# Patient Record
Sex: MALE | Race: WHITE | NOT HISPANIC OR LATINO | RURAL
[De-identification: names, ages, dates, MRNs, and addresses within clinical notes are randomized per-mention and may not be internally consistent; named-entity substitution may affect disease eponyms.]

---

## 2018-07-11 PROBLEM — Z00.00 WELLNESS EXAMINATION: Status: ACTIVE | Noted: 2018-07-11

## 2018-07-11 PROBLEM — G24.8 HEMIDYSTONIA: Status: ACTIVE | Noted: 2018-07-11

## 2020-02-17 ENCOUNTER — OFFICE VISIT (OUTPATIENT)
Dept: FAMILY MEDICINE CLINIC | Facility: CLINIC | Age: 40
End: 2020-02-17

## 2020-02-17 VITALS
BODY MASS INDEX: 25.19 KG/M2 | SYSTOLIC BLOOD PRESSURE: 120 MMHG | OXYGEN SATURATION: 99 % | HEIGHT: 72 IN | TEMPERATURE: 98.3 F | DIASTOLIC BLOOD PRESSURE: 80 MMHG | RESPIRATION RATE: 16 BRPM | HEART RATE: 95 BPM | WEIGHT: 186 LBS

## 2020-02-17 DIAGNOSIS — R37 SEXUAL DYSFUNCTION: ICD-10-CM

## 2020-02-17 DIAGNOSIS — R41.840 ATTENTION DEFICIT: ICD-10-CM

## 2020-02-17 DIAGNOSIS — G24.8 HEMIDYSTONIA: Primary | ICD-10-CM

## 2020-02-17 PROBLEM — Z01.89 LABORATORY TEST: Status: ACTIVE | Noted: 2020-02-17

## 2020-02-17 PROCEDURE — 99395 PREV VISIT EST AGE 18-39: CPT | Performed by: FAMILY MEDICINE

## 2020-02-17 RX ORDER — DIAZEPAM 5 MG/1
5 TABLET ORAL NIGHTLY PRN
COMMUNITY
Start: 2019-11-21

## 2020-02-17 RX ORDER — ATOMOXETINE 40 MG/1
40 CAPSULE ORAL 2 TIMES DAILY
COMMUNITY
Start: 2020-02-14 | End: 2020-05-22

## 2020-02-17 RX ORDER — TRAMADOL HYDROCHLORIDE 50 MG/1
50 TABLET ORAL EVERY 12 HOURS PRN
COMMUNITY
Start: 2020-02-14 | End: 2020-08-13

## 2020-02-17 RX ORDER — BACLOFEN 10 MG/1
TABLET ORAL
COMMUNITY
Start: 2020-02-05

## 2020-02-17 NOTE — PROGRESS NOTES
Subjective   Carlos Magallon is a 39 y.o. male presenting with chief complaint of:   Chief Complaint   Patient presents with   • Establish Care   • Discuss medications       History of Present Illness :  Alone.       Has few chronic problems to consider that might have a bearing on today's issues; somewhat to establish care/ an interval appointment.       Chronic/acute problems reviewed today:   1. 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 Joshua is classified it is that he may dystonia.  The notes I read talk about some psychiatric issues.  Is that the patient doctor have some suspicion that there is an attention issue and he recently was allowed to start some Strattera; Dr. Joshua was concerned about him using Ritalin.  Patient is afraid the Strattera not going to work and wants to know if I will give him Ritalin.   2. Attention deficit: see above   3. Sexual dysfunction: libido,prematurity,ED: Chronic variable.  For years he has had difficulties with sexual function.  More recently had diminished libido that is causing very minimal but some marital stress.  May go a month without intercourse.  Erections are okay for insertion and most of time to complete intercourse; rarely not enough to complete.  Significant prematurity.  The same to talk about this.  Thinks his testosterone may be low.     Has an/another acute issue today: none.    The following portions of the patient's history were reviewed and updated as appropriate: allergies, current medications, past family history, past medical history, past social history, past surgical history and problem list.      Current Outpatient Medications:   •  atomoxetine (STRATTERA) 40 MG capsule, Take 40 mg by mouth 2 (Two) Times a Day., Disp: , Rfl:   •  baclofen (LIORESAL) 10 MG tablet, TAKE 1 TABLET (10 MG TOTAL) BY MOUTH 4 TIMES A DAY., Disp: , Rfl:   •  diazePAM (VALIUM) 5 MG tablet, Take 5 mg by mouth At Night As Needed., Disp: ,  Rfl:   •  loratadine-pseudoephedrine (CLARITIN-D 12 HOUR) 5-120 MG per 12 hr tablet, Take 1 tablet by mouth Daily., Disp: , Rfl:   •  Multiple Vitamins-Minerals (MULTIVITAMIN ADULT PO), Take 1 capsule by mouth Daily., Disp: , Rfl:   •  traMADol (ULTRAM) 50 MG tablet, Take 50 mg by mouth Every 12 (Twelve) Hours As Needed., Disp: , Rfl:     No problems with medications.  Refills if needed done    Allergies   Allergen Reactions   • Sulfa Antibiotics Anaphylaxis       Review of Systems    GENERAL:  Active/slower with limits, speed, stamina for age; work/home/[employer].  Not nearly the injury/performance when younger. . Sleep is usually ok. No fevers.  SKIN: No rash/skin lesion of concern.  ENDO:  No syncope, near or diaphoretic sweaty spells.  HEENT: No recent head injury; or headache.  No vision change.  No significant hearing loss.  Ears without pain/drainage.  No sore throat.  No nasal/sinus congestion/drainage. No epistaxis.  CHEST: No chest wall tenderness or mass. No cough, without wheeze.  No SOB; no hemoptysis.  CV: No exertional chest pain, palpitations, ankle edema.  GI: No dysphagia or heartburn.  No abdominal pain, diarrhea, constipation.  No rectal bleeding, or melena.    :  Voids without dysuria, or  incontinence to completion.  ORTHO: No painful/swollen joints but various on /off sore.  No sore neck or back.  No acute neck or back pain without recent injury.   NEURO: No focal/significant weakness of extremities. dizziness.  Same tremors.  No numbness/paresthesias.   PSYCH: No memory loss.  Mood variable; occ anxious, depressed but/and not suicidal.  Tries to tolerate stress and does not think this relates to sexual issues.   Screening:  Mammogram: NA  Bone density: NA  Low dose CT chest: Tobacco-chew/age 14/dc age 32  GI: NA  Prostate: NA  Usual lab order  None; see below    Lab Results:  No results found for this or any previous visit.    A1C:No results for input(s): HGBA1C in the last 25102  "hours.  PSA:No results for input(s): PSA in the last 67311 hours.  CBC:No results for input(s): WBC, HGB, HCT, PLT, IRONSERUM, IRON in the last 27477 hours.   BMP/CMP:No results for input(s): NA, K, CL, CO2, GLU, BUN, CREATININE, EGFRIFNONA, EGFRIFAFRI, CALCIUM in the last 96978 hours.  HEPATIC:No results for input(s): ALT, AST, ALKPHOS, TOTAL in the last 66908 hours.  THYROID:No results for input(s): TSH, T3FREE, FTI in the last 10777 hours.    Invalid input(s): T4FREE, T3, T4, TEUP,  TOTALT4    Objective   /80   Pulse 95   Temp 98.3 °F (36.8 °C)   Resp 16   Ht 182.9 cm (72\")   Wt 84.4 kg (186 lb)   SpO2 99%   BMI 25.23 kg/m²   Body mass index is 25.23 kg/m².    Recent Vitals       2/17/2020             BP:  120/80    Pulse:  95    Temp:  98.3 °F (36.8 °C)    Weight:  84.4 kg (186 lb)    BMI (Calculated):  25.2        Physical Exam    GENERAL:  Well nourished/developed in no acute distress.   SKIN: Turgor excellent, without wound, rash, lesion.  HEENT: Normal cephalic without trauma.  Pupils equal round reactive to light. Extraocular motions full without nystagmus.   External canals nonobstructive nontender without reddness. Tymphatic membranes colby with oly structures intact.   Oral cavity without growths, exudates, and moist.  Posterior pharynx without mass, obstruction, redness.  No thyromegaly, mass, tenderness, lymphadenopathy and supple.  CV: Regular rhythm.  No murmur, gallop, edema. Posterior pulses intact.  No carotid bruits.  CHEST: No chest wall tenderness or mass.   LUNGS: Symmetric motion with clear to auscultation.  ABD: Soft, nontender without mass.   ORTHO: Symmetric extremities without swelling/point tenderness.  Full gross range of motion.  NEURO: CN 2-12 grossly intact.  Symmetric facies and UE/LE. 3/5 strength throughout. 1/4 x bicep knee equal reflexes.  Nonfocal use extremities. Speech clear. Intact light touch with monofilament, vibratory sensation with tuning fork; equal " toes/distal feet.  Tremor LUE > RUE.    PSYCH: Oriented x 3.  Pleasant calm, well kept.  Purposeful/directed conservation with intact short/long gross memory.     Assessment/Plan     1. Hemidystonia-carmelo    2. Attention deficit    3. Sexual dysfunction: libido,prematurity,ED        Rx: reviewed/changes:  No orders of the defined types were placed in this encounter.      LAB/Testing/Referrals: reviewed/orders:   Today:   No orders of the defined types were placed in this encounter.    Chronic/recurrent labs above or change to:   Next lab testosterone total/free, CBC, CMP, TSH, PSA  12m CBC, CMP, LIPID, TSH    Discussions:   Will see about testosterone; has to come AM lab    Body mass index is 25.23 kg/m².  Patient's Body mass index is 25.23 kg/m². BMI is within normal parameters. No follow-up required..    Tobacco use reviewed:    Non-smoker but chews.   Carlos Magallon  reports that he has quit smoking. He quit after 8.00 years of use. He quit smokeless tobacco use about 6 years ago.. I have educated him on the risk of diseases from using tobacco products such as cancer and mouth.     I advised him to quit and he is not willing to quit.    I spent 3  minutes counseling the patient chewing.           There are no Patient Instructions on file for this visit.    Follow up: Return for AM lab.   .  Future Appointments   Date Time Provider Department Center   2/18/2020  9:10 AM LAB [facility] None

## 2020-02-18 DIAGNOSIS — Z00.00 WELLNESS EXAMINATION: ICD-10-CM

## 2020-02-18 DIAGNOSIS — R37 SEXUAL DYSFUNCTION: Primary | ICD-10-CM

## 2020-02-18 DIAGNOSIS — G24.8 HEMIDYSTONIA: ICD-10-CM

## 2020-02-21 LAB
ALBUMIN SERPL-MCNC: 4.4 G/DL (ref 4–5)
ALBUMIN/GLOB SERPL: 1.5 {RATIO} (ref 1.2–2.2)
ALP SERPL-CCNC: 68 IU/L (ref 39–117)
ALT SERPL-CCNC: 35 IU/L (ref 0–44)
AST SERPL-CCNC: 41 IU/L (ref 0–40)
BASOPHILS # BLD AUTO: 0 X10E3/UL (ref 0–0.2)
BASOPHILS NFR BLD AUTO: 1 %
BILIRUB SERPL-MCNC: 0.5 MG/DL (ref 0–1.2)
BUN SERPL-MCNC: 12 MG/DL (ref 6–20)
BUN/CREAT SERPL: 16 (ref 9–20)
CALCIUM SERPL-MCNC: 9.8 MG/DL (ref 8.7–10.2)
CHLORIDE SERPL-SCNC: 100 MMOL/L (ref 96–106)
CHOLEST SERPL-MCNC: 151 MG/DL (ref 100–199)
CO2 SERPL-SCNC: 26 MMOL/L (ref 20–29)
CREAT SERPL-MCNC: 0.77 MG/DL (ref 0.76–1.27)
EOSINOPHIL # BLD AUTO: 0.1 X10E3/UL (ref 0–0.4)
EOSINOPHIL NFR BLD AUTO: 2 %
ERYTHROCYTE [DISTWIDTH] IN BLOOD BY AUTOMATED COUNT: 13.7 % (ref 11.6–15.4)
GLOBULIN SER CALC-MCNC: 3 G/DL (ref 1.5–4.5)
GLUCOSE SERPL-MCNC: 84 MG/DL (ref 65–99)
HCT VFR BLD AUTO: 41.4 % (ref 37.5–51)
HDLC SERPL-MCNC: 68 MG/DL
HGB BLD-MCNC: 14.2 G/DL (ref 13–17.7)
IMM GRANULOCYTES # BLD AUTO: 0 X10E3/UL (ref 0–0.1)
IMM GRANULOCYTES NFR BLD AUTO: 0 %
LDLC SERPL CALC-MCNC: 73 MG/DL (ref 0–99)
LYMPHOCYTES # BLD AUTO: 1.6 X10E3/UL (ref 0.7–3.1)
LYMPHOCYTES NFR BLD AUTO: 25 %
MCH RBC QN AUTO: 29.8 PG (ref 26.6–33)
MCHC RBC AUTO-ENTMCNC: 34.3 G/DL (ref 31.5–35.7)
MCV RBC AUTO: 87 FL (ref 79–97)
MONOCYTES # BLD AUTO: 0.4 X10E3/UL (ref 0.1–0.9)
MONOCYTES NFR BLD AUTO: 6 %
NEUTROPHILS # BLD AUTO: 4.1 X10E3/UL (ref 1.4–7)
NEUTROPHILS NFR BLD AUTO: 66 %
PLATELET # BLD AUTO: 294 X10E3/UL (ref 150–450)
POTASSIUM SERPL-SCNC: 4.4 MMOL/L (ref 3.5–5.2)
PROT SERPL-MCNC: 7.4 G/DL (ref 6–8.5)
PSA SERPL-MCNC: 0.7 NG/ML (ref 0–4)
RBC # BLD AUTO: 4.76 X10E6/UL (ref 4.14–5.8)
SODIUM SERPL-SCNC: 141 MMOL/L (ref 134–144)
TESTOST FREE SERPL-MCNC: 13.5 PG/ML (ref 8.7–25.1)
TESTOST SERPL-MCNC: 587.7 NG/DL (ref 264–916)
TRIGL SERPL-MCNC: 52 MG/DL (ref 0–149)
TSH SERPL DL<=0.005 MIU/L-ACNC: 0.8 UIU/ML (ref 0.45–4.5)
VLDLC SERPL CALC-MCNC: 10 MG/DL (ref 5–40)
WBC # BLD AUTO: 6.2 X10E3/UL (ref 3.4–10.8)

## 2024-01-03 ENCOUNTER — TRANSCRIBE ORDERS (OUTPATIENT)
Dept: ADMINISTRATIVE | Facility: HOSPITAL | Age: 44
End: 2024-01-03
Payer: COMMERCIAL

## 2024-01-03 ENCOUNTER — HOSPITAL ENCOUNTER (OUTPATIENT)
Dept: CARDIOLOGY | Facility: HOSPITAL | Age: 44
Discharge: HOME | End: 2024-01-03
Admitting: PSYCHIATRY & NEUROLOGY
Payer: COMMERCIAL

## 2024-01-03 DIAGNOSIS — R00.2 PALPITATION: Primary | ICD-10-CM

## 2024-01-03 DIAGNOSIS — R00.2 PALPITATION: ICD-10-CM

## 2024-01-03 LAB
QT INTERVAL: 338 MS
QTC INTERVAL: 433 MS

## 2024-01-03 PROCEDURE — 93005 ELECTROCARDIOGRAM TRACING: CPT | Performed by: PSYCHIATRY & NEUROLOGY

## 2024-01-09 LAB
QT INTERVAL: 338 MS
QTC INTERVAL: 433 MS

## 2025-02-18 ENCOUNTER — TELEPHONE (OUTPATIENT)
Dept: FAMILY MEDICINE CLINIC | Facility: CLINIC | Age: 45
End: 2025-02-18
Payer: COMMERCIAL

## 2025-02-18 NOTE — TELEPHONE ENCOUNTER
[phone]    Telephone:  Patient prior of Dr. Mitchell's.  Needing to be seen for skin concern and possible ulcers.  Can we schedule a re-establish care appointment?    Electronically signed by HI Leung, 02/18/25, 7:07 AM CST.

## 2025-02-25 ENCOUNTER — PATIENT ROUNDING (BHMG ONLY) (OUTPATIENT)
Dept: FAMILY MEDICINE CLINIC | Facility: CLINIC | Age: 45
End: 2025-02-25
Payer: COMMERCIAL

## 2025-02-25 ENCOUNTER — OFFICE VISIT (OUTPATIENT)
Dept: FAMILY MEDICINE CLINIC | Facility: CLINIC | Age: 45
End: 2025-02-25
Payer: COMMERCIAL

## 2025-02-25 VITALS
TEMPERATURE: 97.4 F | DIASTOLIC BLOOD PRESSURE: 98 MMHG | BODY MASS INDEX: 26.52 KG/M2 | WEIGHT: 195.8 LBS | OXYGEN SATURATION: 99 % | SYSTOLIC BLOOD PRESSURE: 132 MMHG | HEART RATE: 62 BPM | HEIGHT: 72 IN

## 2025-02-25 DIAGNOSIS — K21.9 GASTROESOPHAGEAL REFLUX DISEASE WITHOUT ESOPHAGITIS: Primary | ICD-10-CM

## 2025-02-25 DIAGNOSIS — Z00.00 WELLNESS EXAMINATION: ICD-10-CM

## 2025-02-25 DIAGNOSIS — R03.0 ELEVATED BLOOD PRESSURE READING: ICD-10-CM

## 2025-02-25 PROCEDURE — 99203 OFFICE O/P NEW LOW 30 MIN: CPT | Performed by: NURSE PRACTITIONER

## 2025-02-25 RX ORDER — TRIAMCINOLONE ACETONIDE 1 MG/G
1 CREAM TOPICAL 2 TIMES DAILY
COMMUNITY

## 2025-02-25 RX ORDER — OMEPRAZOLE 40 MG/1
40 CAPSULE, DELAYED RELEASE ORAL DAILY
Qty: 30 CAPSULE | Refills: 5 | Status: SHIPPED | OUTPATIENT
Start: 2025-02-25

## 2025-02-25 RX ORDER — CLONAZEPAM 1 MG/1
TABLET ORAL
COMMUNITY
Start: 2025-02-04

## 2025-02-25 NOTE — PROGRESS NOTES
Subjective   Chief Complaint:  Abdominal discomfort    History of Present Illness  The patient is a 44-year-old male presenting to [facility] care.    He has been experiencing persistent upper abdominal discomfort, described as a burning sensation and tightness, akin to a knot. This discomfort intensifies after consuming heavy meals, leading to regurgitation. The symptoms have been present for an extended period but have recently escalated in severity. He reports daily episodes of vomiting clear liquid, even without heavy eating, but does not report any coffee-ground emesis. He also reports increased belching and a sulfuric taste in his mouth. His mother has H. pylori infection. He has been self-medicating with over-the-counter Prilosec 20 mg daily, which he finds beneficial.    He does not monitor his blood pressure at home. He reports occasional episodes of palpitations. His diastolic blood pressure typically ranges in the 70s, but he has observed an increase in his systolic readings with age. He had a blood pressure check 2 weeks ago at [facility], but he does not recall the specific readings.    FAMILY HISTORY  His mother has H. pylori infection.    MEDICATIONS  Current: Prilosec (over-the-counter)    Past Medical, Surgical, Social, and Family History:  Allergies   Allergen Reactions    Sulfa Antibiotics Anaphylaxis    Dexamethasone Unknown - High Severity      Past Medical History:   Diagnosis Date    Allergic     Cancer 27    Skin cancer    Dystonia     History of medical problems 2014    Dystonia      Past Surgical History:   Procedure Laterality Date    APPENDECTOMY      VASECTOMY        Social History     Socioeconomic History    Marital status:    Tobacco Use    Smoking status: Former     Current packs/day: 0.00     Average packs/day: 2.0 packs/day for 8.7 years (17.4 ttl pk-yrs)     Types: Cigarettes     Start date: 1994     Quit date: 2003     Years since quittin.1      "Passive exposure: Past    Smokeless tobacco: Former     Quit date: 5/17/2013    Tobacco comments:     quit about 16 years aog   Vaping Use    Vaping status: Never Used   Substance and Sexual Activity    Alcohol use: Never    Drug use: Never    Sexual activity: Yes     Partners: Female      Family History   Problem Relation Age of Onset    Liver disease Mother     Arthritis Mother     Cancer Maternal Uncle     COPD Maternal Grandmother     Stroke Maternal Grandmother     Cancer Maternal Grandfather     Heart disease Maternal Grandfather     Arthritis Maternal Grandfather     Stroke Maternal Grandfather        Objective   Vital Signs  /98   Pulse 62   Temp 97.4 °F (36.3 °C) (Infrared)   Ht 182.9 cm (72\")   Wt 88.8 kg (195 lb 12.8 oz)   SpO2 99%   BMI 26.56 kg/m²    Physical Exam  Constitutional:       General: He is not in acute distress.  Neck:      Vascular: No carotid bruit.   Cardiovascular:      Rate and Rhythm: Normal rate and regular rhythm.      Pulses: Normal pulses.           Dorsalis pedis pulses are 2+ on the right side and 2+ on the left side.        Posterior tibial pulses are 2+ on the right side and 2+ on the left side.      Heart sounds: No murmur heard.     No friction rub. No gallop.   Pulmonary:      Effort: Pulmonary effort is normal. No respiratory distress.      Breath sounds: Normal breath sounds. No wheezing or rhonchi.   Musculoskeletal:      Right lower leg: No edema.      Left lower leg: No edema.   Neurological:      Mental Status: He is alert.       Assessment & Plan   Assessment & Plan  1. Suspected peptic ulcer disease.  He reports symptoms of burning and tightness in the upper abdomen, along with vomiting clear liquid. Differential diagnosis includes H. pylori infection. A breath test for H. pylori will be conducted today. He will be initiated on a regimen of vonoprazan 20 mg daily for a duration of 4 weeks. Subsequently, he will transition to omeprazole 40 mg daily. He is " advised to maintain a bland diet and avoid coffee and sodas. He should discontinue the over-the-counter Prilosec. If there is no improvement within a week, a consultation with a gastroenterologist will be considered.    2. Elevated blood pressure.  His blood pressure is slightly elevated during this visit. He is advised to monitor his blood pressure at home, aiming for 3 readings per week. If the readings consistently exceed 140/85, he should inform the clinic for potential initiation of antihypertensive therapy.    3. Health maintenance.  Routine laboratory tests, including cholesterol and thyroid function, will be ordered to update his health status.    Follow-up:  The patient will Return for follow-up as needed pending results - we will call.    Records and Results Reviewed:  I reviewed current medications as given by patient and allergy list.    : [employer]- and Dragon Speech Recognition - No recording technology was used in the exam room during encounter.    Electronically signed by HI Leung, 02/25/25, 8:37 AM CST.

## 2025-02-25 NOTE — PROGRESS NOTES
February 25, 2025    Hello, may I speak with Carlos S Naun?    My name is Sue      I am  with [facility]  [address] [facility] [address] [phone].    Before we get started may I verify your date of birth? 1980    I am calling to officially welcome you to our practice and ask about your recent visit. Is this a good time to talk? yes    Tell me about your visit with us. What things went well?  visit went well       We're always looking for ways to make our patients' experiences even better. Do you have recommendations on ways we may improve?  no    Overall were you satisfied with your first visit to our practice? yes       I appreciate you taking the time to speak with me today. Is there anything else I can do for you? no      Thank you, and have a great day.

## 2025-02-26 LAB
ALBUMIN SERPL-MCNC: 4.2 G/DL (ref 3.5–5.2)
ALBUMIN/GLOB SERPL: 1.2 G/DL
ALP SERPL-CCNC: 93 U/L (ref 39–117)
ALT SERPL-CCNC: 29 U/L (ref 1–41)
AST SERPL-CCNC: 23 U/L (ref 1–40)
BASOPHILS # BLD AUTO: 0.07 10*3/MM3 (ref 0–0.2)
BASOPHILS NFR BLD AUTO: 0.7 % (ref 0–1.5)
BILIRUB SERPL-MCNC: 0.3 MG/DL (ref 0–1.2)
BUN SERPL-MCNC: 14 MG/DL (ref 6–20)
BUN/CREAT SERPL: 17.1 (ref 7–25)
CALCIUM SERPL-MCNC: 9.5 MG/DL (ref 8.6–10.5)
CHLORIDE SERPL-SCNC: 98 MMOL/L (ref 98–107)
CHOLEST SERPL-MCNC: 159 MG/DL (ref 0–200)
CO2 SERPL-SCNC: 30.2 MMOL/L (ref 22–29)
CREAT SERPL-MCNC: 0.82 MG/DL (ref 0.76–1.27)
EGFRCR SERPLBLD CKD-EPI 2021: 112.5 ML/MIN/1.73
EOSINOPHIL # BLD AUTO: 0.06 10*3/MM3 (ref 0–0.4)
EOSINOPHIL NFR BLD AUTO: 0.6 % (ref 0.3–6.2)
ERYTHROCYTE [DISTWIDTH] IN BLOOD BY AUTOMATED COUNT: 12.4 % (ref 12.3–15.4)
GLOBULIN SER CALC-MCNC: 3.5 GM/DL
GLUCOSE SERPL-MCNC: 89 MG/DL (ref 65–99)
HCT VFR BLD AUTO: 46 % (ref 37.5–51)
HDLC SERPL-MCNC: 70 MG/DL (ref 40–60)
HGB BLD-MCNC: 15.1 G/DL (ref 13–17.7)
IMM GRANULOCYTES # BLD AUTO: 0.05 10*3/MM3 (ref 0–0.05)
IMM GRANULOCYTES NFR BLD AUTO: 0.5 % (ref 0–0.5)
LDLC SERPL CALC-MCNC: 72 MG/DL (ref 0–100)
LYMPHOCYTES # BLD AUTO: 2.05 10*3/MM3 (ref 0.7–3.1)
LYMPHOCYTES NFR BLD AUTO: 20.4 % (ref 19.6–45.3)
MCH RBC QN AUTO: 31.2 PG (ref 26.6–33)
MCHC RBC AUTO-ENTMCNC: 32.8 G/DL (ref 31.5–35.7)
MCV RBC AUTO: 95 FL (ref 79–97)
MONOCYTES # BLD AUTO: 0.86 10*3/MM3 (ref 0.1–0.9)
MONOCYTES NFR BLD AUTO: 8.5 % (ref 5–12)
NEUTROPHILS # BLD AUTO: 6.98 10*3/MM3 (ref 1.7–7)
NEUTROPHILS NFR BLD AUTO: 69.3 % (ref 42.7–76)
NRBC BLD AUTO-RTO: 0 /100 WBC (ref 0–0.2)
PLATELET # BLD AUTO: 317 10*3/MM3 (ref 140–450)
POTASSIUM SERPL-SCNC: 4.3 MMOL/L (ref 3.5–5.2)
PROT SERPL-MCNC: 7.7 G/DL (ref 6–8.5)
RBC # BLD AUTO: 4.84 10*6/MM3 (ref 4.14–5.8)
SODIUM SERPL-SCNC: 138 MMOL/L (ref 136–145)
TRIGL SERPL-MCNC: 91 MG/DL (ref 0–150)
TSH SERPL DL<=0.005 MIU/L-ACNC: 1.04 UIU/ML (ref 0.27–4.2)
UREA BREATH TEST QL: NEGATIVE
VLDLC SERPL CALC-MCNC: 17 MG/DL (ref 5–40)
WBC # BLD AUTO: 10.07 10*3/MM3 (ref 3.4–10.8)

## 2025-02-28 NOTE — PROGRESS NOTES
Reviewed results - [employer] message sent.  If not seen in 3 days (3 day alert set), will send to pool to call the message.      Electronically signed by HI Leung, 02/28/25, 11:04 AM CST.

## 2025-08-04 RX ORDER — OMEPRAZOLE 40 MG/1
40 CAPSULE, DELAYED RELEASE ORAL DAILY
Qty: 90 CAPSULE | Refills: 1 | Status: SHIPPED | OUTPATIENT
Start: 2025-08-04

## 2025-09-01 ENCOUNTER — HOSPITAL ENCOUNTER (OUTPATIENT)
Dept: GENERAL RADIOLOGY | Age: 45
Discharge: HOME | End: 2025-09-01
Payer: COMMERCIAL

## 2025-09-01 ENCOUNTER — OFFICE VISIT (OUTPATIENT)
Age: 45
End: 2025-09-01

## 2025-09-01 VITALS
WEIGHT: 194.6 LBS | SYSTOLIC BLOOD PRESSURE: 108 MMHG | TEMPERATURE: 99 F | HEIGHT: 72 IN | DIASTOLIC BLOOD PRESSURE: 74 MMHG | HEART RATE: 71 BPM | BODY MASS INDEX: 26.36 KG/M2 | RESPIRATION RATE: 20 BRPM | OXYGEN SATURATION: 96 %

## 2025-09-01 DIAGNOSIS — S39.92XA INJURY OF COCCYX, INITIAL ENCOUNTER: Primary | ICD-10-CM

## 2025-09-01 DIAGNOSIS — S39.92XA INJURY OF COCCYX, INITIAL ENCOUNTER: ICD-10-CM

## 2025-09-01 PROCEDURE — 72220 X-RAY EXAM SACRUM TAILBONE: CPT

## 2025-09-01 RX ORDER — ATOMOXETINE 60 MG/1
60 CAPSULE ORAL DAILY
COMMUNITY
Start: 2025-06-26 | End: 2026-06-27

## 2025-09-01 RX ORDER — OMEPRAZOLE 40 MG/1
40 CAPSULE, DELAYED RELEASE ORAL DAILY
COMMUNITY
Start: 2025-08-04

## 2025-09-01 RX ORDER — KETOROLAC TROMETHAMINE 30 MG/ML
30 INJECTION, SOLUTION INTRAMUSCULAR; INTRAVENOUS ONCE
Status: COMPLETED | OUTPATIENT
Start: 2025-09-01 | End: 2025-09-01

## 2025-09-01 RX ORDER — CLONAZEPAM 1 MG/1
TABLET ORAL
COMMUNITY
Start: 2025-02-04

## 2025-09-01 RX ADMIN — KETOROLAC TROMETHAMINE 30 MG: 30 INJECTION, SOLUTION INTRAMUSCULAR; INTRAVENOUS at 12:53

## 2025-09-01 ASSESSMENT — ENCOUNTER SYMPTOMS
COLOR CHANGE: 0
EYE PAIN: 0
WHEEZING: 0
NAUSEA: 0
COUGH: 0
CONSTIPATION: 0
SHORTNESS OF BREATH: 0
ABDOMINAL DISTENTION: 0
SINUS PAIN: 0
SORE THROAT: 0
BACK PAIN: 1
EYE DISCHARGE: 0
ABDOMINAL PAIN: 0
DIARRHEA: 0
TROUBLE SWALLOWING: 0
RHINORRHEA: 0
APNEA: 0
SINUS PRESSURE: 0
EYE ITCHING: 0
VOMITING: 0
CHEST TIGHTNESS: 0